# Patient Record
Sex: MALE | Race: WHITE
[De-identification: names, ages, dates, MRNs, and addresses within clinical notes are randomized per-mention and may not be internally consistent; named-entity substitution may affect disease eponyms.]

---

## 2020-11-01 ENCOUNTER — HOSPITAL ENCOUNTER (EMERGENCY)
Dept: HOSPITAL 41 - JD.ED | Age: 20
Discharge: HOME | End: 2020-11-01
Payer: COMMERCIAL

## 2020-11-01 DIAGNOSIS — R09.1: Primary | ICD-10-CM

## 2020-11-01 NOTE — EDM.PDOC
ED HPI GENERAL MEDICAL PROBLEM





- General


Chief Complaint: Chest Pain


Stated Complaint: CHEST PAIN


Time Seen by Provider: 11/01/20 12:33


Source of Information: Reports: Patient


History Limitations: Reports: No Limitations





- History of Present Illness


INITIAL COMMENTS - FREE TEXT/NARRATIVE: 





The patient presents with chest pain.  This started this morning after he woke 

up.  The pain is sharp and it is in the left chest.  It comes and goes.  He went

to work and the pain got worse.  He has no shortness of breath with it but it 

does make it hard to breath when the pain hits.  He has no fever, chills, cough,

congestion, runny nose, abdominal pain, nausea or vomiting.  He has no history 

of heart problems.  He does have hypercholesterolemia and he is obese.  He does 

not smoke.


Onset: Gradual


Duration: Hour(s):


Location: Reports: Chest


Quality: Reports: Sharp


Severity: Moderate


Improves with: Reports: None


Worsens with: Reports: None


Associated Symptoms: Reports: Chest Pain.  Denies: Cough, Fever/Chills, 

Headaches, Nausea/Vomiting, Shortness of Breath





- Related Data


                                    Allergies











Allergy/AdvReac Type Severity Reaction Status Date / Time


 


No Known Allergies Allergy   Verified 11/01/20 12:41














Past Medical History


HEENT History: Reports: Impaired Vision


Other HEENT History: wears glasses


Cardiovascular History: Reports: High Cholesterol


Psychiatric History: Reports: Depression





- Past Surgical History


HEENT Surgical History: Reports: Adenoidectomy, Oral Surgery, Tonsillectomy


Other HEENT Surgeries/Procedures: wisdom teeth





Social & Family History





- Family History


Family Medical History: Noncontributory





- Tobacco Use


Tobacco Use Status *Q: Never Tobacco User





ED ROS GENERAL





- Review of Systems


Review Of Systems: See Below


Constitutional: Reports: No Symptoms


HEENT: Reports: No Symptoms


Respiratory: Reports: No Symptoms


Cardiovascular: Reports: Chest Pain


Endocrine: Reports: No Symptoms


GI/Abdominal: Reports: No Symptoms


: Reports: No Symptoms


Musculoskeletal: Reports: No Symptoms





ED EXAM, GENERAL





- Physical Exam


Exam: See Below


Exam Limited By: No Limitations


General Appearance: Alert, No Apparent Distress


Ears: Normal External Exam


Nose: Normal Inspection


Head: Atraumatic, Normocephalic


Neck: Normal Inspection


Respiratory/Chest: No Respiratory Distress, Lungs Clear, Normal Breath Sounds


Cardiovascular: Regular Rate, Rhythm, No Edema, No Murmur


GI/Abdominal: Soft, Non-Tender, No Organomegaly, No Mass


Back Exam: Normal Inspection


Extremities: Normal Inspection


  ** #1 Interpretation


EKG Date: 11/01/20


Time: 12:48


Rhythm: NSR


Rate (Beats/Min): 96


Axis: Normal


P-Wave: Present


QRS: Normal


ST-T: Normal


QT: Normal





Course





- Vital Signs


Last Recorded V/S: 


                                Last Vital Signs











Temp  97.4 F   11/01/20 12:37


 


Pulse  93   11/01/20 12:37


 


Resp  18   11/01/20 12:37


 


BP  130/73   11/01/20 12:37


 


Pulse Ox  97   11/01/20 12:37














- Orders/Labs/Meds


Orders: 


                               Active Orders 24 hr











 Category Date Time Status


 


 Cardiac Monitoring [RC] .AS DIRECTED Care  11/01/20 12:39 Active


 


 EKG Documentation Completion [RC] STAT Care  11/01/20 12:40 Active


 


 Chest 2V [CR] Stat Exams  11/01/20 12:40 Taken











Labs: 


                                Laboratory Tests











  11/01/20 11/01/20 Range/Units





  12:57 12:57 


 


WBC  9.62 H   (4.23-9.07)  K/mm3


 


RBC  5.20   (4.63-6.08)  M/mm3


 


Hgb  14.9   (13.7-17.5)  gm/dl


 


Hct  42.9   (40.1-51.0)  %


 


MCV  82.5   (79.0-92.2)  fl


 


MCH  28.7   (25.7-32.2)  pg


 


MCHC  34.7   (32.2-35.5)  g/dl


 


RDW Std Deviation  37.1   (35.1-43.9)  fL


 


Plt Count  262   (163-337)  K/mm3


 


MPV  9.9   (9.4-12.3)  fl


 


Neut % (Auto)  52.3   (34.0-67.9)  %


 


Lymph % (Auto)  35.6   (21.8-53.1)  %


 


Mono % (Auto)  10.4   (5.3-12.2)  %


 


Eos % (Auto)  1.5   (0.8-7.0)  


 


Baso % (Auto)  0.2   (0.1-1.2)  %


 


Neut # (Auto)  5.04   (1.78-5.38)  K/mm3


 


Lymph # (Auto)  3.42   (1.32-3.57)  K/mm3


 


Mono # (Auto)  1.00 H   (0.30-0.82)  K/mm3


 


Eos # (Auto)  0.14   (0.04-0.54)  K/mm3


 


Baso # (Auto)  0.02   (0.01-0.08)  K/mm3


 


Sodium   139  (136-145)  mEq/L


 


Potassium   3.7  (3.5-5.1)  mEq/L


 


Chloride   102  ()  mEq/L


 


Carbon Dioxide   28  (21-32)  mEq/L


 


Anion Gap   12.7  (5-15)  


 


BUN   10  (7-18)  mg/dL


 


Creatinine   1.1  (0.7-1.3)  mg/dL


 


Est Cr Clr Drug Dosing   105.34  mL/min


 


Estimated GFR (MDRD)   > 60  (>60)  mL/min


 


BUN/Creatinine Ratio   9.1 L  (14-18)  


 


Glucose   83  ()  mg/dL


 


Calcium   9.4  (8.5-10.1)  mg/dL


 


Total Bilirubin   0.3  (0.2-1.0)  mg/dL


 


AST   30  (15-37)  U/L


 


ALT   57  (16-63)  U/L


 


Alkaline Phosphatase   96  ()  U/L


 


Troponin I   < 0.017  (0.00-0.056)  ng/mL


 


Total Protein   7.5  (6.4-8.2)  g/dl


 


Albumin   3.8  (3.4-5.0)  g/dl


 


Globulin   3.7  gm/dL


 


Albumin/Globulin Ratio   1.0  (1-2)  














- Re-Assessments/Exams


Free Text/Narrative Re-Assessment/Exam: 





11/01/20 13:26


I ordered an EKG, CXR and labs.  His EKG shows a NSR with no acute changes.  His

 CXR shows nothing acute.  I am waiting on labs.


11/01/20 13:47


His WBC was slightly elevated at 9.62.  His CMP looks good.  His troponin is 

negative.  I feel this could be some pleurisy.  I will discharge him home.





Departure





- Departure


Time of Disposition: 13:50


Disposition: Home, Self-Care 01


Condition: Good


Clinical Impression: 


 Pleurisy





Referrals: 


PCP,None [Primary Care Provider] - 


Chance Pond NP [Nurse Practitioner] - 1 Week


Forms:  ED Department Discharge


Additional Instructions: 


Take tylenol or motrin for pain.  Please return if you are worse.





Sepsis Event Note (ED)





- Evaluation


Sepsis Screening Result: No Definite Risk





- Focused Exam


Vital Signs: 


                                   Vital Signs











  Temp Pulse Resp BP Pulse Ox


 


 11/01/20 12:37  97.4 F  93  18  130/73  97














- My Orders


Last 24 Hours: 


My Active Orders





11/01/20 12:39


Cardiac Monitoring [RC] .AS DIRECTED 





11/01/20 12:40


EKG Documentation Completion [RC] STAT 


Chest 2V [CR] Stat 














- Assessment/Plan


Last 24 Hours: 


My Active Orders





11/01/20 12:39


Cardiac Monitoring [RC] .AS DIRECTED 





11/01/20 12:40


EKG Documentation Completion [RC] STAT 


Chest 2V [CR] Stat

## 2020-11-02 NOTE — CR
PROCEDURE INFORMATION:

Exam: XR Chest, 2 Views

Exam date and time: 11/1/2020 12:45 PM

Age: 20 years old

Clinical indication: Chest pain; Type not specified



TECHNIQUE:

Imaging protocol: XR of the chest

Views: 2 views.



COMPARISON:

No relevant prior studies available.



FINDINGS:

Lungs: Unremarkable. No consolidation.

Pleural space: Unremarkable. No pleural effusion. No pneumothorax.

Heart/Mediastinum: Unremarkable. No cardiomegaly.

Bones/joints: Unremarkable.



IMPRESSION:

No acute findings.



Thank you for allowing us to participate in the care of your patient.



Dictated and Authenticated by: Anthony Mendoza MD

11/01/2020 1:56 PM Central Time ([place] & [place])

MARTIN